# Patient Record
Sex: MALE
[De-identification: names, ages, dates, MRNs, and addresses within clinical notes are randomized per-mention and may not be internally consistent; named-entity substitution may affect disease eponyms.]

---

## 2020-02-28 PROBLEM — Z00.00 ENCOUNTER FOR PREVENTIVE HEALTH EXAMINATION: Status: ACTIVE | Noted: 2020-02-28

## 2020-03-06 ENCOUNTER — APPOINTMENT (OUTPATIENT)
Dept: OTOLARYNGOLOGY | Facility: CLINIC | Age: 40
End: 2020-03-06
Payer: COMMERCIAL

## 2020-03-06 VITALS
WEIGHT: 160 LBS | DIASTOLIC BLOOD PRESSURE: 93 MMHG | HEIGHT: 68 IN | BODY MASS INDEX: 24.25 KG/M2 | HEART RATE: 72 BPM | SYSTOLIC BLOOD PRESSURE: 150 MMHG

## 2020-03-06 DIAGNOSIS — H61.23 IMPACTED CERUMEN, BILATERAL: ICD-10-CM

## 2020-03-06 PROCEDURE — 31231 NASAL ENDOSCOPY DX: CPT

## 2020-03-06 PROCEDURE — 69210 REMOVE IMPACTED EAR WAX UNI: CPT

## 2020-03-06 PROCEDURE — 99204 OFFICE O/P NEW MOD 45 MIN: CPT | Mod: 25

## 2020-03-06 NOTE — PHYSICAL EXAM
[Midline] : trachea located in midline position [Normal] : no rashes [de-identified] : hypertonic muscles  [de-identified] : cerumen deep in the canal

## 2020-03-06 NOTE — HISTORY OF PRESENT ILLNESS
[de-identified] : fullness both ears almost a years\par feels he can hear, had and an audio in [place] that was normal \par no Vertigo, tinnitus, pain, drainage or facial weakness.\par constant, \par no modulating factors\par cannot pop his ears\par \par nasal congestion worse on the right , sleeps with head turned to the right effecting neck\par runny nose - all year around \par sneezing and runny nose\par occ take claritin D, does not help ears, does help allergies\par no sinus pressure\par no sinus infections\par smell is good\par allergy test in the past - took in [place] - had lots of local stuff

## 2020-03-06 NOTE — REVIEW OF SYSTEMS
[Sneezing] : sneezing [Seasonal Allergies] : seasonal allergies [Ear Pain] : ear pain [Ear Itch] : ear itch [Nasal Congestion] : nasal congestion [Nose Bleeds] : nose bleeds [Sinus Pain] : sinus pain [Sinus Pressure] : sinus pressure [Eyes Itch] : itching of the eyes [Negative] : Heme/Lymph [FreeTextEntry3] : watery eyes

## 2020-03-06 NOTE — ASSESSMENT
[FreeTextEntry1] : ear pressure - cerumen removed, ETD vs neck muscular tightness\par PT for neck\par nasal tx\par previous audio, declined audio and tymps \par \par Nasal congestion with mild septal deviation and bilateral turbinate hypertrophy. Will start regiment to see if may improve symptoms and escalate if needed. anterior right deflection involving nasal valve \par - Will start Flonase. A topical steroid reduce mucosal swelling, illustrated appropriate use and how to reduce the risk of bleeding \par - Nasal irrigation and showed how to use it to maximize effectiveness \par - allergy test next visit\par - lysis of left adhesion next visit \par - breath right strips

## 2020-03-06 NOTE — PROCEDURE
[FreeTextEntry3] : Procedure- removal of cerumen bilaterally\par Diagnosis - cerumen impaction\par bilateral ears found to have impacted cerumen - they were cleared with suction and curette, canals appeared normal.\par  [FreeTextEntry6] : Procedure performed: Nasal Endoscopy- Diagnostic\par Pre-op indication(s): nasal congestion\par Post-op indication(s): nasal congestion \par Verbal and/or written consent obtained from patient\par Anterior rhinoscopy insufficient to account for symptoms\par Scope #: 3,  flexible fiber optic telescope \par The scope was introduced in the nasal passage between the middle and inferior turbinates to exam the inferior portion of the middle meatus and the fontanelle, as well as the maxillary ostia.  Next, the scope was passed medically and posteriorly to the middle turbinates to examine the sphenoethmoid recess and the superior turbinate region.\par Upon visualization the finders are as follows:\par Nasal Septum: right over left septal deviation, scar band left septum to inf turb \par Bilateral - Mucosa: boggy turbinates, Mucous: scant, Polyp: not seen, Inferior Turbinate: boggy, Middle Turbinate: normal, Superior Turbinate: normal, Inferior Meatus: narrow, Middle Meatus: narrow, Super Meatus:normal, Sphenoethmoidal Recess: clear\par

## 2020-03-06 NOTE — REASON FOR VISIT
[Initial Consultation] : an initial consultation for [FreeTextEntry2] : ears clogged, nasal congestion right nostril

## 2020-06-02 ENCOUNTER — APPOINTMENT (OUTPATIENT)
Dept: OTOLARYNGOLOGY | Facility: CLINIC | Age: 40
End: 2020-06-02
Payer: COMMERCIAL

## 2020-06-02 VITALS
TEMPERATURE: 98.5 F | SYSTOLIC BLOOD PRESSURE: 134 MMHG | BODY MASS INDEX: 24.25 KG/M2 | HEART RATE: 62 BPM | WEIGHT: 160 LBS | HEIGHT: 68 IN | DIASTOLIC BLOOD PRESSURE: 89 MMHG

## 2020-06-02 DIAGNOSIS — J34.2 DEVIATED NASAL SEPTUM: ICD-10-CM

## 2020-06-02 DIAGNOSIS — H69.83 OTHER SPECIFIED DISORDERS OF EUSTACHIAN TUBE, BILATERAL: ICD-10-CM

## 2020-06-02 DIAGNOSIS — M54.2 CERVICALGIA: ICD-10-CM

## 2020-06-02 DIAGNOSIS — J34.3 HYPERTROPHY OF NASAL TURBINATES: ICD-10-CM

## 2020-06-02 PROCEDURE — 99214 OFFICE O/P EST MOD 30 MIN: CPT

## 2020-06-02 NOTE — ASSESSMENT
[FreeTextEntry1] : Nasal congestion with mild septal deviation and bilateral turbinate hypertrophy. pt with anterior right deflection involving nasal valve \par - pt declined nasal endoscopy and removal of cerumen \par - pt also declined lysis of left adhesion \par - Continue Flonase. A topical steroid reduce mucosal swelling, illustrated appropriate use and how to reduce the risk of bleeding \par - claritin\par - Nasal irrigation and showed how to use it to maximize effectiveness\par - advised pt could try breath right strips \par pt with non-severely deranged nasal anatomy - would defer nasal surgery if at all possible. do not think will make a large difference and treat the issues he would like fixed. he feels ear is a bigger issue\par has an allergy test from 1 year ago showing + negative control but severe grass allergy \par \par ear pressure - myofascial release\par will consider tube if persists, pt understands normal tymps on previous exam and would just be a shot in the dark that may or may not work. based on sx and help with oral decongestants there is a possibility transient ET dysfunction is the issue \par normal hearing test reviewed form 1 year ago

## 2020-06-02 NOTE — END OF VISIT
[FreeTextEntry3] : I personally saw and examined ANDREW ALMANZAR in detail. I spoke to AROLDO Fagan regarding the assessment and plan of care.  I preformed the procedures and I reviewed the above assessment and plan of care, and agree. I have made changes in changes in the body of the note where appropriate.\par \par

## 2020-06-02 NOTE — HISTORY OF PRESENT ILLNESS
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 \par no sinus pressure\par no sinus infections\par smell is good\par allergy test in the past - took in [place], pt allergic to grass, and has many other allergies.

## 2020-06-02 NOTE — PHYSICAL EXAM
[Midline] : trachea located in midline position [Normal] : cranial nerves 2-12 intact [de-identified] : hypertonic muscles

## 2020-06-02 NOTE — REVIEW OF SYSTEMS
[Sneezing] : sneezing [Seasonal Allergies] : seasonal allergies [Ear Pain] : ear pain [Nasal Congestion] : nasal congestion [Ear Itch] : ear itch [Nose Bleeds] : nose bleeds [Sinus Pain] : sinus pain [Sinus Pressure] : sinus pressure [Eyes Itch] : itching of the eyes [Negative] : Endocrine [FreeTextEntry3] : watery eyes

## 2020-06-04 ENCOUNTER — TRANSCRIPTION ENCOUNTER (OUTPATIENT)
Age: 40
End: 2020-06-04

## 2020-08-04 ENCOUNTER — RX RENEWAL (OUTPATIENT)
Age: 40
End: 2020-08-04

## 2020-08-04 RX ORDER — FLUTICASONE PROPIONATE 50 UG/1
50 SPRAY, METERED NASAL DAILY
Qty: 1 | Refills: 3 | Status: ACTIVE | COMMUNITY
Start: 2020-03-06 | End: 1900-01-01